# Patient Record
Sex: FEMALE | URBAN - NONMETROPOLITAN AREA
[De-identification: names, ages, dates, MRNs, and addresses within clinical notes are randomized per-mention and may not be internally consistent; named-entity substitution may affect disease eponyms.]

---

## 2023-08-09 ENCOUNTER — APPOINTMENT (RX ONLY)
Dept: URBAN - NONMETROPOLITAN AREA CLINIC 13 | Facility: CLINIC | Age: 58
Setting detail: DERMATOLOGY
End: 2023-08-09

## 2023-08-09 DIAGNOSIS — Z41.9 ENCOUNTER FOR PROCEDURE FOR PURPOSES OTHER THAN REMEDYING HEALTH STATE, UNSPECIFIED: ICD-10-CM

## 2023-08-09 PROCEDURE — ? BOTOX

## 2023-08-09 PROCEDURE — ? COSMETIC CONSULTATION: FILLERS

## 2023-08-09 PROCEDURE — ? COSMETIC CONSULTATION: BOTOX

## 2023-08-09 PROCEDURE — ? COSMETIC CONSULTATION: FRACTIONAL RESURFACING

## 2023-08-09 ASSESSMENT — LOCATION DETAILED DESCRIPTION DERM
LOCATION DETAILED: RIGHT INFERIOR MEDIAL FOREHEAD
LOCATION DETAILED: LEFT SUPERIOR FOREHEAD
LOCATION DETAILED: RIGHT LATERAL FOREHEAD
LOCATION DETAILED: LEFT INFERIOR FOREHEAD
LOCATION DETAILED: LEFT FOREHEAD
LOCATION DETAILED: RIGHT FOREHEAD
LOCATION DETAILED: GLABELLA
LOCATION DETAILED: RIGHT INFERIOR FOREHEAD
LOCATION DETAILED: SUPERIOR MID FOREHEAD
LOCATION DETAILED: LEFT INFERIOR MEDIAL FOREHEAD
LOCATION DETAILED: LEFT LATERAL FOREHEAD
LOCATION DETAILED: RIGHT SUPERIOR FOREHEAD

## 2023-08-09 ASSESSMENT — LOCATION SIMPLE DESCRIPTION DERM
LOCATION SIMPLE: GLABELLA
LOCATION SIMPLE: LEFT FOREHEAD
LOCATION SIMPLE: SUPERIOR FOREHEAD
LOCATION SIMPLE: RIGHT FOREHEAD

## 2023-08-09 ASSESSMENT — LOCATION ZONE DERM: LOCATION ZONE: FACE

## 2023-08-09 NOTE — PROCEDURE: BOTOX
Additional Area 6 Units: 0
Additional Area 5 Location: Infraorbital eyelids
Lot #: [id number]
Show Nasal Units: Yes
Show Right And Left Periorbital Units: No
Price (Use Numbers Only, No Special Characters Or $): 049
Additional Area 4 Location: Carmen-oral lip lines
Glabellar Complex Units: 20
Incrementing Botox Units: By 0.5 Units
Dilution (U/0.1 Cc): 4
Additional Area 2 Location: Upper [place]en-Oral Lines
Additional Area 3 Location: Brow Lift
Detail Level: Detailed
Consent obtained through EMA charting. Risks include but not limited to lid/brow ptosis, bruising, swelling, diplopia, temporary effect, incomplete chemical denervation.\\n\\nBotox administered per written protocol per Pat Yepez MD.\\n\\nPhotos taken prior to injections.
Additional Area 1 Location: Lateral brow lift
Forehead Units: 8
Comments: Patient encouraged to come back in 2 weeks for touch up if needed.\\n\\n Patient agrees with plan and verbalizes understanding.
Post-Care Instructions: Instructed to not lie down for 4 hours, do not massage areas injected, avoid headbands/hats if forehead was treated, and limit physical activity and extreme heat for 24 hours.
Additional Area 6 Location: Left lateral brow
Expiration Date (Month Year): 12/2025

## 2023-08-29 ENCOUNTER — APPOINTMENT (RX ONLY)
Dept: URBAN - NONMETROPOLITAN AREA CLINIC 13 | Facility: CLINIC | Age: 58
Setting detail: DERMATOLOGY
End: 2023-08-29

## 2023-08-29 DIAGNOSIS — Z41.9 ENCOUNTER FOR PROCEDURE FOR PURPOSES OTHER THAN REMEDYING HEALTH STATE, UNSPECIFIED: ICD-10-CM

## 2023-08-29 PROCEDURE — ? COSMETIC FOLLOW-UP

## 2023-08-29 PROCEDURE — ? BOTOX

## 2023-08-29 ASSESSMENT — LOCATION DETAILED DESCRIPTION DERM
LOCATION DETAILED: LEFT LATERAL EYEBROW
LOCATION DETAILED: GLABELLA
LOCATION DETAILED: RIGHT CENTRAL EYEBROW
LOCATION DETAILED: RIGHT LATERAL EYEBROW
LOCATION DETAILED: LEFT INFERIOR MEDIAL FOREHEAD
LOCATION DETAILED: RIGHT FOREHEAD
LOCATION DETAILED: LEFT FOREHEAD

## 2023-08-29 ASSESSMENT — LOCATION SIMPLE DESCRIPTION DERM
LOCATION SIMPLE: RIGHT FOREHEAD
LOCATION SIMPLE: LEFT EYEBROW
LOCATION SIMPLE: GLABELLA
LOCATION SIMPLE: RIGHT EYEBROW
LOCATION SIMPLE: LEFT FOREHEAD

## 2023-08-29 ASSESSMENT — LOCATION ZONE DERM: LOCATION ZONE: FACE

## 2023-08-29 NOTE — PROCEDURE: COSMETIC FOLLOW-UP
Global Improvement: Very Good
Side Effects Override (Free Text): Under correction on glabella and forehead. Suggest supporting forehead with lateral brow lift.
Treatment (Optional): Botox
Detail Level: Zone
Patient Satisfaction: Pleased
Side Effects Or Complications: Under correction

## 2023-08-29 NOTE — PROCEDURE: BOTOX
Additional Area 4 Units: 0
Additional Area 3 Location: Brow Lift
Show Right And Left Brow Units: No
Show Additional Area 1: Yes
Consent obtained through EMA charting. Risks include but not limited to lid/brow ptosis, bruising, swelling, diplopia, temporary effect, incomplete chemical denervation.\\n\\nBotox administered per written protocol per Pat Yepez MD.\\n\\nPhotos taken prior to injections.
Dilution (U/0.1 Cc): 4
Additional Area 6 Location: Chin
Incrementing Botox Units: By 0.5 Units
Post-Care Instructions: Instructed to not lie down for 4 hours, do not massage areas injected, avoid headbands/hats if forehead was treated, and limit physical activity and extreme heat for 24 hours.
Additional Area 2 Location: Upper Carmen-Oral Lines
Additional Area 5 Location: Infraorbital eyelids
Comments: Patient encouraged to come back in 2 weeks for touch up if needed.\\n\\n Patient agrees with plan and verbalizes understanding.
Detail Level: Detailed
Additional Area 1 Location: Lateral brow lift
Forehead Units: 2
Patient Specific Comments (Will Not Stick From Patient To Patient): No charge for touch up. 12 units professional sample used for touch up.
Glabellar Complex Units: 6
Expiration Date (Month Year): 04/2026
Additional Area 4 Location: Carmen-oral lip lines
Lot #: [id number]